# Patient Record
Sex: FEMALE | Race: WHITE
[De-identification: names, ages, dates, MRNs, and addresses within clinical notes are randomized per-mention and may not be internally consistent; named-entity substitution may affect disease eponyms.]

---

## 2018-06-25 ENCOUNTER — HOSPITAL ENCOUNTER (OUTPATIENT)
Dept: HOSPITAL 80 - FED | Age: 28
Setting detail: OBSERVATION
LOS: 1 days | Discharge: HOME | End: 2018-06-26
Attending: INTERNAL MEDICINE | Admitting: INTERNAL MEDICINE
Payer: MEDICAID

## 2018-06-25 DIAGNOSIS — R00.0: ICD-10-CM

## 2018-06-25 DIAGNOSIS — E86.0: ICD-10-CM

## 2018-06-25 DIAGNOSIS — E10.10: Primary | ICD-10-CM

## 2018-06-25 DIAGNOSIS — Z79.4: ICD-10-CM

## 2018-06-25 DIAGNOSIS — D72.829: ICD-10-CM

## 2018-06-25 LAB — PLATELET # BLD: 391 10^3/UL (ref 150–400)

## 2018-06-25 PROCEDURE — 96374 THER/PROPH/DIAG INJ IV PUSH: CPT

## 2018-06-25 PROCEDURE — 99291 CRITICAL CARE FIRST HOUR: CPT

## 2018-06-25 PROCEDURE — 96361 HYDRATE IV INFUSION ADD-ON: CPT

## 2018-06-25 PROCEDURE — G0378 HOSPITAL OBSERVATION PER HR: HCPCS

## 2018-06-25 RX ADMIN — INSULIN LISPRO SCH: 100 INJECTION, SOLUTION INTRAVENOUS; SUBCUTANEOUS at 23:38

## 2018-06-25 NOTE — EDPHY
H & P


Stated Complaint: N/V INTERMITTENT PELVIC PAIN/DIABETIC "THOUGHT IT WAS A 

HANGOVER"


Time Seen by Provider: 06/25/18 11:32


HPI/ROS: 





CHIEF COMPLAINT:  Pelvic pain, vomiting, diabetic female





HISTORY OF PRESENT ILLNESS:  The patient has a history of insulin-dependent 

diabetes and presents to the ED with 2 days of intractable vomiting and 

elevated blood sugars.  The patient reports she is also having intermittent 

generalized pelvic discomfort.  She denies any vaginal bleeding, discharge or 

dysuria.  The patient denies any recent hospitalizations for diabetic 

ketoacidosis.  She is on long and short-acting insulin which she has been 

compliant with.  She reports her blood sugar this morning was 350.  The patient 

denies any diarrhea.  She denies additional acute complaints aside from mild to 

moderate bilateral lower pelvic pain.





REVIEW OF SYSTEMS:


A comprehensive 10 point review of systems is otherwise negative aside from 

elements mentioned in the history of present illness.


Source: Patient





- Personal History


LMP (Females 10-55): 1-7 Days Ago


Current Tetanus/Diphtheria Vaccine: Yes





- Medical/Surgical History


Hx Diabetes: Yes


Other PMH: DIABETIC





- Social History


Smoking Status: Never smoked





- Physical Exam


Exam: 





General Appearance:  Alert, no distress


Eyes:  Pupils equal and round no pallor or injection


ENT, Mouth:  Dry mucous membranes


Respiratory:  There are no retractions, lungs are clear to auscultation


Cardiovascular:  Regular rate and rhythm


Gastrointestinal:  Minimal bilateral lower abdominal tenderness, no peritoneal 

signs, normal bowel sounds


Neurological:  5/5 strength all 4 extremities


Skin:  Warm and dry, no rashes


Musculoskeletal:  Neck is supple nontender


Extremities:  symmetrical, full range of motion








Constitutional: 


 Initial Vital Signs











Temperature (C)  36.5 C   06/25/18 11:29


 


Heart Rate  104 H  06/25/18 11:29


 


Respiratory Rate  18   06/25/18 11:29


 


Blood Pressure  138/73 H  06/25/18 11:29


 


O2 Sat (%)  100   06/25/18 11:29








 











O2 Delivery Mode               Room Air














Allergies/Adverse Reactions: 


 





No Known Allergies Allergy (Verified 06/25/18 13:20)


 








Home Medications: 














 Medication  Instructions  Recorded


 


Insulin Aspart [novoLOG] 0 unit SQ TIDMEAL 06/25/18


 


Insulin Detemir [Levemir] 35 unit SQ DAILY 06/25/18


 


Melatonin [Melatonin 5 mg] 5 mg PO HS 06/25/18














Medical Decision Making


ED Course/Re-evaluation: 





The patient presents to the ED with hyperglycemia, vomiting dehydration.  She 

is noted to be in diabetic ketoacidosis with a venous pH of 7.1 a an anion gap 

of 33.





The patient received 3 L of normal saline.  She is started on insulin drip.





I consulted the hospitalist at 12:45pm for ICU admission.  She will be admitted 

Dr. Miller.





Re-evaluated the patient at 1:00 p.m..  She is informed of the plan for 

admission and diagnosis of diabetic ketoacidosis.





 


Differential Diagnosis: 





Differential diagnosis considered includes diabetic ketoacidosis, 

gastroenteritis, ectopic pregnancy, urinary tract infection, hypokalemia, renal 

failure


Critical Care Time: 





Critical care time exclusive of procedures and exclusive of the PA's time was 

35 minutes, performed by myself, Vignesh Queen MD.  The patient presents to 

the ED with diabetic ketoacidosis.  She received aggressive fluid rehydration 

and was started on insulin drip.  She will be admitted to the intensive care 

unit for further stabilization.











- Data Points


Laboratory Results: 


 Laboratory Results





 06/25/18 11:46 





 06/25/18 11:46 





 











  06/25/18 06/25/18 06/25/18





  12:25 12:25 11:47


 


WBC      





    


 


RBC      





    


 


Hgb      





    


 


Hct      





    


 


MCV      





    


 


MCH      





    


 


MCHC      





    


 


RDW      





    


 


Plt Count      





    


 


MPV      





    


 


Neut % (Auto)      





    


 


Lymph % (Auto)      





    


 


Mono % (Auto)      





    


 


Eos % (Auto)      





    


 


Baso % (Auto)      





    


 


Nucleat RBC Rel Count      





    


 


Absolute Neuts (auto)      





    


 


Absolute Lymphs (auto)      





    


 


Absolute Monos (auto)      





    


 


Absolute Eos (auto)      





    


 


Absolute Basos (auto)      





    


 


Absolute Nucleated RBC      





    


 


Immature Gran %      





    


 


Immature Gran #      





    


 


Puncture Site  VENOUS     





    


 


VBG pH  7.12  L   7.12  L*   





   (7.31-7.42)   (7.31-7.42)  


 


VBG HCO3  7 mEQ/L L mEQ/L    





   (22-26)   


 


VBG Total CO2  8 mEq/L L mEq/L    





   (21-27)   


 


VBG O2 Saturation  91 % H %    





   (65-75)   


 


VBG Base Excess  -21.2 mEq/L L mEq/L    





   (-2.5-2.5)   


 


Mixed VBG pCO2  24 mmHg L mmHg    





   (40-44)   


 


Mixed VBG pO2  82 mmHG H mmHG    





   (35-40)   


 


Sodium      





    


 


Potassium      





    


 


Chloride      





    


 


Carbon Dioxide      





    


 


Anion Gap      





    


 


BUN      





    


 


Creatinine      





    


 


Estimated GFR      





    


 


Glucose      





    


 


Calcium      





    


 


Beta-Hydroxybutyrate      





    


 


Beta HCG, Qual      





    


 


Urine Color      PALE YELLOW 





    


 


Urine Appearance      HAZY 





    


 


Urine pH      5.0 





     (5.0-7.5) 


 


Ur Specific Gravity      1.025 





     (1.002-1.030) 


 


Urine Protein      NEGATIVE 





     (NEGATIVE) 


 


Urine Ketones      2+  H 





     (NEGATIVE) 


 


Urine Blood      2+  H 





     (NEGATIVE) 


 


Urine Nitrate      NEGATIVE 





     (NEGATIVE) 


 


Urine Bilirubin      NEGATIVE 





     (NEGATIVE) 


 


Urine Urobilinogen      NEGATIVE EU EU





     (0.2-1.0) 


 


Ur Leukocyte Esterase      1+  H 





     (NEGATIVE) 


 


Urine RBC      3-5 /hpf H /hpf





     (0-3) 


 


Urine WBC      3-5 /hpf H /hpf





     (0-3) 


 


Ur Epithelial Cells      1+ /lpf /lpf





     (NONE-1+) 


 


Urine Bacteria      TRACE /hpf H /hpf





     (NONE SEEN) 


 


Urine Mucus      TRACE /lpf /lpf





     (NONE-1+) 


 


Urine Glucose      3+  H 





     (NEGATIVE) 














  06/25/18 06/25/18 06/25/18





  11:46 11:46 11:46


 


WBC      18.93 10^3/uL H 10^3/uL





     (3.80-9.50) 


 


RBC      5.10 10^6/uL 10^6/uL





     (4.18-5.33) 


 


Hgb      15.2 g/dL g/dL





     (12.6-16.3) 


 


Hct      47.4 % H %





     (38.0-47.0) 


 


MCV      92.9 fL fL





     (81.5-99.8) 


 


MCH      29.8 pg pg





     (27.9-34.1) 


 


MCHC      32.1 g/dL L g/dL





     (32.4-36.7) 


 


RDW      12.6 % %





     (11.5-15.2) 


 


Plt Count      391 10^3/uL 10^3/uL





     (150-400) 


 


MPV      10.9 fL fL





     (8.7-11.7) 


 


Neut % (Auto)      83.7 % H %





     (39.3-74.2) 


 


Lymph % (Auto)      9.5 % L %





     (15.0-45.0) 


 


Mono % (Auto)      5.6 % %





     (4.5-13.0) 


 


Eos % (Auto)      0.1 % L %





     (0.6-7.6) 


 


Baso % (Auto)      0.3 % %





     (0.3-1.7) 


 


Nucleat RBC Rel Count      0.0 % %





     (0.0-0.2) 


 


Absolute Neuts (auto)      15.85 10^3/uL H 10^3/uL





     (1.70-6.50) 


 


Absolute Lymphs (auto)      1.79 10^3/uL 10^3/uL





     (1.00-3.00) 


 


Absolute Monos (auto)      1.06 10^3/uL H 10^3/uL





     (0.30-0.80) 


 


Absolute Eos (auto)      0.01 10^3/uL L 10^3/uL





     (0.03-0.40) 


 


Absolute Basos (auto)      0.06 10^3/uL 10^3/uL





     (0.02-0.10) 


 


Absolute Nucleated RBC      0.00 10^3/uL 10^3/uL





     (0-0.01) 


 


Immature Gran %      0.8 % %





     (0.0-1.1) 


 


Immature Gran #      0.16 10^3/uL H 10^3/uL





     (0.00-0.10) 


 


Puncture Site      





    


 


VBG pH      





    


 


VBG HCO3      





    


 


VBG Total CO2      





    


 


VBG O2 Saturation      





    


 


VBG Base Excess      





    


 


Mixed VBG pCO2      





    


 


Mixed VBG pO2      





    


 


Sodium    136 mEq/L mEq/L  





    (135-145)  


 


Potassium    4.6 mEq/L mEq/L  





    (3.3-5.0)  


 


Chloride    96 mEq/L L mEq/L  





    ()  


 


Carbon Dioxide    7 mEq/l L* mEq/l  





    (22-31)  


 


Anion Gap    33 mEq/L H mEq/L  





    (8-16)  


 


BUN    22 mg/dL mg/dL  





    (7-23)  


 


Creatinine    1.0 mg/dL mg/dL  





    (0.6-1.0)  


 


Estimated GFR    > 60   





    


 


Glucose    472 mg/dL H mg/dL  





    ()  


 


Calcium    10.6 mg/dL H mg/dL  





    (8.5-10.4)  


 


Beta-Hydroxybutyrate      





    


 


Beta HCG, Qual  NEGATIVE     





    


 


Urine Color      





    


 


Urine Appearance      





    


 


Urine pH      





    


 


Ur Specific Gravity      





    


 


Urine Protein      





    


 


Urine Ketones      





    


 


Urine Blood      





    


 


Urine Nitrate      





    


 


Urine Bilirubin      





    


 


Urine Urobilinogen      





    


 


Ur Leukocyte Esterase      





    


 


Urine RBC      





    


 


Urine WBC      





    


 


Ur Epithelial Cells      





    


 


Urine Bacteria      





    


 


Urine Mucus      





    


 


Urine Glucose      





    














  06/25/18





  11:42


 


WBC  





  


 


RBC  





  


 


Hgb  





  


 


Hct  





  


 


MCV  





  


 


MCH  





  


 


MCHC  





  


 


RDW  





  


 


Plt Count  





  


 


MPV  





  


 


Neut % (Auto)  





  


 


Lymph % (Auto)  





  


 


Mono % (Auto)  





  


 


Eos % (Auto)  





  


 


Baso % (Auto)  





  


 


Nucleat RBC Rel Count  





  


 


Absolute Neuts (auto)  





  


 


Absolute Lymphs (auto)  





  


 


Absolute Monos (auto)  





  


 


Absolute Eos (auto)  





  


 


Absolute Basos (auto)  





  


 


Absolute Nucleated RBC  





  


 


Immature Gran %  





  


 


Immature Gran #  





  


 


Puncture Site  





  


 


VBG pH  





  


 


VBG HCO3  





  


 


VBG Total CO2  





  


 


VBG O2 Saturation  





  


 


VBG Base Excess  





  


 


Mixed VBG pCO2  





  


 


Mixed VBG pO2  





  


 


Sodium  





  


 


Potassium  





  


 


Chloride  





  


 


Carbon Dioxide  





  


 


Anion Gap  





  


 


BUN  





  


 


Creatinine  





  


 


Estimated GFR  





  


 


Glucose  





  


 


Calcium  





  


 


Beta-Hydroxybutyrate  Pending 





  


 


Beta HCG, Qual  





  


 


Urine Color  





  


 


Urine Appearance  





  


 


Urine pH  





  


 


Ur Specific Gravity  





  


 


Urine Protein  





  


 


Urine Ketones  





  


 


Urine Blood  





  


 


Urine Nitrate  





  


 


Urine Bilirubin  





  


 


Urine Urobilinogen  





  


 


Ur Leukocyte Esterase  





  


 


Urine RBC  





  


 


Urine WBC  





  


 


Ur Epithelial Cells  





  


 


Urine Bacteria  





  


 


Urine Mucus  





  


 


Urine Glucose  





  











Medications Given: 


 





Sodium Chloride (Ns)  1,000 mls @ 1,000 mls/hr IV EDNOW ONE


   PRN Reason: Protocol


   Stop: 06/25/18 13:32


   Last Admin: 06/25/18 13:02 Dose:  1,000 mls


Potassium Chloride (Potassium Cl 10 Meq (Premix))  100 mls @ 100 mls/hr IV 

EDNOW ONE


   Stop: 06/25/18 13:44


   Last Admin: 06/25/18 13:02 Dose:  100 mls





Discontinued Medications





Sodium Chloride (Ns)  1,000 mls @ 0 mls/hr IV EDNOW ONE; Wide Open


   PRN Reason: Protocol


   Stop: 06/25/18 11:51


   Last Admin: 06/25/18 11:54 Dose:  1,000 mls


Sodium Chloride (Ns)  1,000 mls @ 0 mls/hr IV EDNOW ONE; Wide Open


   PRN Reason: Protocol


   Stop: 06/25/18 11:51


   Last Admin: 06/25/18 11:55 Dose:  1,000 mls


Insulin Human Regular 100 unit / Miscellaneous Medication 1 ea/ Sodium Chloride

  101 mls @ 0 mls/hr IV EDNOW ONE; Per Protocol


   PRN Reason: Protocol


   Stop: 06/25/18 12:34


   Last Admin: 06/25/18 13:16 Dose:  101 mls


Ondansetron HCl (Zofran)  4 mg IVP EDNOW ONE


   Stop: 06/25/18 11:51


   Last Admin: 06/25/18 11:54 Dose:  4 mg








Departure





- Departure


Disposition: [facility]


Clinical Impression: 


 Diabetic ketoacidosis





Condition: Fair

## 2018-06-25 NOTE — GHP
[f 
rep st]



                                                            HISTORY AND PHYSICAL





DATE OF ADMISSION:  06/25/2018



CHIEF COMPLAINT:  DKA.



HISTORY OF PRESENT ILLNESS:  A pleasant 27-year-old female with type 1 diabetes 
on insulin, presenting with persistent nausea and vomiting for the past 2 days.
  She was at a rugby tournament on Saturday and had a very long day of drinking 
alcohol.  She threw up once that night and then had progressive nausea, 
vomiting beginning the next day to the point where she was unable to keep 
anything down.  She complains of lower abdominal pain, which she contributes to 
retching.  Has felt hot.  No chills or sweats.  Denies cough.  Has a 
chronically runny nose.  No dysuria, urinary frequency, urgency, or diarrhea.  
She has been trying to keep Pedialyte down.  Has had hospitalization in the 
past for DKA, but it has been several years.



REVIEW OF SYSTEMS:  I completed a 10-point review of systems, negative, except 
as noted in HPI.



PAST MEDICAL HISTORY:  Includes type 1 diabetes, on insulin, prior DKA, history 
of ruptured ovarian cysts.



PAST SURGICAL HISTORY:  Tonsils and adenoidectomy.



SOCIAL HISTORY:  Lives in [place] with her boyfriend.  Drinks 1-2 alcoholic 
drinks a night.  Smokes marijuana.  No illicits.  No tobacco.  Works for 
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



FAMILY HISTORY:  Great aunt and uncle with diabetes.  Maternal grandmother with 
a stroke.  Maternal grandfather with heart issues.



ALLERGIES:  None.



HOME MEDICATIONS:  Melatonin, Levemir 35 units daily, sliding scale NovoLog.



PHYSICAL EXAMINATION:  VITAL SIGNS:  Temperature 36.5, blood pressure 138/73, 
heart rate 104 to 110s, respirations 18, 100% on room air.  GENERAL:  Well-
appearing female, sitting up in no acute distress.  HEENT:  PERRLA.  Mildly dry 
mucous membranes.  CV:  Tachy, regular.  No murmurs, gallops, or rubs.  LUNGS:  
Clear.  No crackles or wheezing.  ABDOMEN:  Soft, nontender, nondistended.  
Positive bowel sounds.  :  No Spence.  No suprapubic or CVA tenderness.  
MUSCULOSKELETAL:  5/5 upper lower extremity strength.  SKIN:  Warm, dry.  No 
ulceration or rash.  NEURO:  2 through 12 intact.  PSYCH:  Alert and oriented 
x3.



LABORATORY DATA:  WBCs 18, hemoglobin 15, hematocrit 47.  VBG:  pH 7.12, bicarb 
7, CO2 8.  Sodium 140, potassium 4.5, chloride 107, carbon dioxide 7, BUN 20, 
creatinine 0.8, glucose 340, repeat 472.  Negative pregnancy test.  UA:  +1 
leukocyte esterase, negative nitrites, trace bacteria.



ASSESSMENT/PLAN:  

1.  Diabetic ketoacidosis: due to acute nausea, vomiting with alcohol binge. 
Has pyuria on UA, but hold of abx until culture since asymptomatic. Monitor in 
the ICU on insulin drip and DKA protocol.

2.  Tachycardia: from dehydration:  Give intravenous fluids.

3.  Leukocytosis:  Stress inflammation and dehydration.  Repeat in the morning 
after fluids.  

4.  Anion gap metabolic acidosis, secondary to starvation ketoacidosis:  Again, 
diabetic ketoacidosis protocol, intravenous fluids.  

5.  Deep venous thrombosis prophylaxis:  Low risk, ambulatory.



DISPOSITION:  Patient warrants observation admission given acute DKA warranting 
insulin drip, ICU monitoring, serial labs.





Job #:  [id number]

MTDD

## 2018-06-26 VITALS — SYSTOLIC BLOOD PRESSURE: 108 MMHG | DIASTOLIC BLOOD PRESSURE: 57 MMHG

## 2018-06-26 LAB — PLATELET # BLD: 240 10^3/UL (ref 150–400)

## 2018-06-26 RX ADMIN — INSULIN LISPRO SCH: 100 INJECTION, SOLUTION INTRAVENOUS; SUBCUTANEOUS at 07:58

## 2018-06-26 NOTE — GDS
[f 
rep st]



                                                             DISCHARGE SUMMARY





DISCHARGE DIAGNOSES:  

1.  Diabetic ketoacidosis.

2.  Tachycardia.

3.  Leukocytosis.

4.  Anion gap metabolic acidosis.



HISTORY OF PRESENT ILLNESS:  A pleasant 27-year-old type 1 female on insulin, 
presented with persistent nausea and vomiting for the past 2 days.  She was at 
a rugby tournament on Saturday and had a very long day of drinking.  She threw 
up that night, and then had progressive nausea beginning the next day, to the 
point where she was unable to keep anything down.  She complains of lower 
abdominal pain, which she attributes to retching.  Denies cough, dysuria, 
urinary frequency, or diarrhea.



HOSPITAL COURSE BY PROBLEM:  

1.  DKA:  Secondary to acute nausea/vomiting with alcohol binge.  Had pyuria on 
UA, but negative culture.  No symptoms.  She was treated with insulin drip.  
Gap closed quickly.  She is to be transitioned back to her home insulin.  She 
is now eating and drinking without issue.

2.  Tachycardia due to severe dehydration.  This has resolved.

3.  Leukocytosis.  White count was 18 at time of admission.  This was stress 
inflammation, dehydration, now 13, afebrile without any infectious symptoms.

4.  Anion gap metabolic acidosis secondary to starvation ketosis.  This closed 
with insulin and fluids.

5.  Disposition:  Patient is stable for discharge home.



NEW MEDICATIONS:  None.



FOLLOWUP:  With her primary and endocrinologist.



PHYSICAL EXAM:  VITAL SIGNS:  Temperature 36.7, blood pressure 108/57, heart 
rate in the 80s, respirations 16, 94% on room air.  GENERAL:  Well appearing, 
smiling, sitting up in bed, no acute distress.  HEENT:  PERRLA.  Moist mucous 
membranes.  CV:  Regular rate and rhythm.  LUNGS:  Clear.  ABDOMEN:  Soft, 
nontender.  Positive bowel sounds.  :  No CVA or suprapubic tenderness.  
MUSCULOSKELETAL:  5/5 upper and lower extremity strength.  NEURO:  Cranial 
nerves 2 through 12 intact.  PSYCH:  Alert and oriented x3.





Job #:  [id number]

MTDD